# Patient Record
Sex: FEMALE | Race: WHITE | NOT HISPANIC OR LATINO | URBAN - METROPOLITAN AREA
[De-identification: names, ages, dates, MRNs, and addresses within clinical notes are randomized per-mention and may not be internally consistent; named-entity substitution may affect disease eponyms.]

---

## 2020-09-30 ENCOUNTER — OFFICE (OUTPATIENT)
Dept: URBAN - METROPOLITAN AREA CLINIC 19 | Facility: CLINIC | Age: 60
End: 2020-09-30
Payer: COMMERCIAL

## 2020-09-30 VITALS
HEART RATE: 79 BPM | DIASTOLIC BLOOD PRESSURE: 72 MMHG | OXYGEN SATURATION: 97 % | SYSTOLIC BLOOD PRESSURE: 117 MMHG | HEIGHT: 66 IN | WEIGHT: 126 LBS

## 2020-09-30 DIAGNOSIS — R13.10 DYSPHAGIA, UNSPECIFIED: ICD-10-CM

## 2020-09-30 DIAGNOSIS — K76.9 LIVER DISEASE, UNSPECIFIED: ICD-10-CM

## 2020-09-30 DIAGNOSIS — R19.8 OTHER SPECIFIED SYMPTOMS AND SIGNS INVOLVING THE DIGESTIVE S: ICD-10-CM

## 2020-09-30 DIAGNOSIS — Z86.010 PERSONAL HISTORY OF COLONIC POLYPS: ICD-10-CM

## 2020-09-30 DIAGNOSIS — Z83.71 FAMILY HISTORY OF COLONIC POLYPS: ICD-10-CM

## 2020-09-30 DIAGNOSIS — K21.9 GASTRO-ESOPHAGEAL REFLUX DISEASE WITHOUT ESOPHAGITIS: ICD-10-CM

## 2020-09-30 LAB
AFP, SERUM, TUMOR MARKER: 1.3 NG/ML (ref 0–8.3)
CBC, PLATELET, NO DIFFERENTIAL: HEMATOCRIT: 42.6 % (ref 34–46.6)
CBC, PLATELET, NO DIFFERENTIAL: HEMOGLOBIN: 13.3 G/DL (ref 11.1–15.9)
CBC, PLATELET, NO DIFFERENTIAL: MCH: 26.7 PG (ref 26.6–33)
CBC, PLATELET, NO DIFFERENTIAL: MCHC: 31.2 G/DL — LOW (ref 31.5–35.7)
CBC, PLATELET, NO DIFFERENTIAL: MCV: 86 FL (ref 79–97)
CBC, PLATELET, NO DIFFERENTIAL: PLATELETS: 395 X10E3/UL (ref 150–450)
CBC, PLATELET, NO DIFFERENTIAL: RBC: 4.98 X10E6/UL (ref 3.77–5.28)
CBC, PLATELET, NO DIFFERENTIAL: RDW: 12.2 % (ref 11.7–15.4)
CBC, PLATELET, NO DIFFERENTIAL: WBC: 8 X10E3/UL (ref 3.4–10.8)
COMP. METABOLIC PANEL (14): A/G RATIO: 1.7 (ref 1.2–2.2)
COMP. METABOLIC PANEL (14): ALBUMIN: 4.3 G/DL (ref 3.8–4.8)
COMP. METABOLIC PANEL (14): ALKALINE PHOSPHATASE: 42 IU/L (ref 39–117)
COMP. METABOLIC PANEL (14): ALT (SGPT): 18 IU/L (ref 0–32)
COMP. METABOLIC PANEL (14): AST (SGOT): 23 IU/L (ref 0–40)
COMP. METABOLIC PANEL (14): BILIRUBIN, TOTAL: 0.3 MG/DL (ref 0–1.2)
COMP. METABOLIC PANEL (14): BUN/CREATININE RATIO: 16 (ref 12–28)
COMP. METABOLIC PANEL (14): BUN: 18 MG/DL (ref 8–27)
COMP. METABOLIC PANEL (14): CALCIUM: 9.1 MG/DL (ref 8.7–10.3)
COMP. METABOLIC PANEL (14): CARBON DIOXIDE, TOTAL: 27 MMOL/L (ref 20–29)
COMP. METABOLIC PANEL (14): CHLORIDE: 102 MMOL/L (ref 96–106)
COMP. METABOLIC PANEL (14): CREATININE: 1.12 MG/DL — HIGH (ref 0.57–1)
COMP. METABOLIC PANEL (14): EGFR IF AFRICN AM: 61 ML/MIN/1.73 (ref 59–?)
COMP. METABOLIC PANEL (14): EGFR IF NONAFRICN AM: 53 ML/MIN/1.73 — LOW (ref 59–?)
COMP. METABOLIC PANEL (14): GLOBULIN, TOTAL: 2.5 G/DL (ref 1.5–4.5)
COMP. METABOLIC PANEL (14): GLUCOSE: 92 MG/DL (ref 65–99)
COMP. METABOLIC PANEL (14): POTASSIUM: 4.5 MMOL/L (ref 3.5–5.2)
COMP. METABOLIC PANEL (14): PROTEIN, TOTAL: 6.8 G/DL (ref 6–8.5)
COMP. METABOLIC PANEL (14): SODIUM: 143 MMOL/L (ref 134–144)

## 2020-09-30 PROCEDURE — 99214 OFFICE O/P EST MOD 30 MIN: CPT

## 2020-09-30 NOTE — SERVICENOTES
The patient's assessment was reviewed with Dr. Jain and a collaborative plan of care was established.

## 2020-09-30 NOTE — SERVICEHPINOTES
60-year-old  white female returns for evaluation of nonspecific liver lesions found on recent CT heart calcium test 9/29/20 routinely done with her cardiologist, Neno Hammond MD.  She has had some mild loss of appetite in the last few months, but she attributes this to significant stress in her life and denies any weight loss.  She has mild chronic reflux, that she takes pantoprazole 40 mg as needed.  She has some mild cervical dysphagia that she attributes after an anterior cervical disc surgery she had late last year.  She has very occasional bouts of diarrhea when she is under lot of stress, but this occurs once a month and is reportedly of little consequence.  She denies nausea, vomiting or overt GI bleeding. She was last here for colonoscopy 10/22/15  which found diverticulosis coli and removed a small sessile serrated adenoma. Colonoscopy 11/12/08 found diverticulosis coli. Her sister had colon polyps in her 40s and her brother has had colon polyps less than age 60.

## 2020-10-28 ENCOUNTER — AMBULATORY SURGICAL CENTER (OUTPATIENT)
Dept: URBAN - METROPOLITAN AREA SURGERY 2 | Facility: SURGERY | Age: 60
End: 2020-10-28
Payer: COMMERCIAL

## 2020-10-28 ENCOUNTER — AMBULATORY SURGICAL CENTER (OUTPATIENT)
Dept: URBAN - METROPOLITAN AREA SURGERY 2 | Facility: SURGERY | Age: 60
End: 2020-10-28

## 2020-10-28 ENCOUNTER — OFFICE (OUTPATIENT)
Dept: URBAN - METROPOLITAN AREA PATHOLOGY 22 | Facility: PATHOLOGY | Age: 60
End: 2020-10-28
Payer: COMMERCIAL

## 2020-10-28 VITALS
DIASTOLIC BLOOD PRESSURE: 69 MMHG | DIASTOLIC BLOOD PRESSURE: 59 MMHG | RESPIRATION RATE: 16 BRPM | DIASTOLIC BLOOD PRESSURE: 69 MMHG | DIASTOLIC BLOOD PRESSURE: 62 MMHG | HEART RATE: 68 BPM | SYSTOLIC BLOOD PRESSURE: 98 MMHG | RESPIRATION RATE: 17 BRPM | TEMPERATURE: 98.3 F | HEART RATE: 65 BPM | HEART RATE: 57 BPM | SYSTOLIC BLOOD PRESSURE: 93 MMHG | DIASTOLIC BLOOD PRESSURE: 59 MMHG | OXYGEN SATURATION: 100 % | OXYGEN SATURATION: 97 % | HEART RATE: 65 BPM | HEART RATE: 61 BPM | RESPIRATION RATE: 16 BRPM | SYSTOLIC BLOOD PRESSURE: 110 MMHG | DIASTOLIC BLOOD PRESSURE: 57 MMHG | HEART RATE: 60 BPM | DIASTOLIC BLOOD PRESSURE: 65 MMHG | TEMPERATURE: 97 F | DIASTOLIC BLOOD PRESSURE: 57 MMHG | HEART RATE: 60 BPM | RESPIRATION RATE: 17 BRPM | OXYGEN SATURATION: 94 % | WEIGHT: 125 LBS | DIASTOLIC BLOOD PRESSURE: 69 MMHG | HEART RATE: 65 BPM | SYSTOLIC BLOOD PRESSURE: 93 MMHG | SYSTOLIC BLOOD PRESSURE: 110 MMHG | RESPIRATION RATE: 17 BRPM | TEMPERATURE: 98.3 F | HEART RATE: 61 BPM | OXYGEN SATURATION: 94 % | SYSTOLIC BLOOD PRESSURE: 93 MMHG | OXYGEN SATURATION: 100 % | HEART RATE: 60 BPM | WEIGHT: 125 LBS | DIASTOLIC BLOOD PRESSURE: 62 MMHG | DIASTOLIC BLOOD PRESSURE: 59 MMHG | DIASTOLIC BLOOD PRESSURE: 65 MMHG | DIASTOLIC BLOOD PRESSURE: 57 MMHG | OXYGEN SATURATION: 97 % | HEART RATE: 68 BPM | HEIGHT: 66 IN | SYSTOLIC BLOOD PRESSURE: 110 MMHG | OXYGEN SATURATION: 94 % | HEART RATE: 61 BPM | SYSTOLIC BLOOD PRESSURE: 98 MMHG | DIASTOLIC BLOOD PRESSURE: 62 MMHG | SYSTOLIC BLOOD PRESSURE: 98 MMHG | HEIGHT: 66 IN | TEMPERATURE: 97 F | SYSTOLIC BLOOD PRESSURE: 111 MMHG | RESPIRATION RATE: 16 BRPM | TEMPERATURE: 97 F | WEIGHT: 125 LBS | DIASTOLIC BLOOD PRESSURE: 65 MMHG | SYSTOLIC BLOOD PRESSURE: 111 MMHG | OXYGEN SATURATION: 100 % | TEMPERATURE: 98.3 F | HEART RATE: 57 BPM | OXYGEN SATURATION: 97 % | SYSTOLIC BLOOD PRESSURE: 111 MMHG | HEART RATE: 68 BPM | HEART RATE: 57 BPM | HEIGHT: 66 IN

## 2020-10-28 DIAGNOSIS — K21.00 GASTRO-ESOPHAGEAL REFLUX DISEASE WITH ESOPHAGITIS, WITHOUT B: ICD-10-CM

## 2020-10-28 DIAGNOSIS — K29.50 UNSPECIFIED CHRONIC GASTRITIS WITHOUT BLEEDING: ICD-10-CM

## 2020-10-28 DIAGNOSIS — Z83.71 FAMILY HISTORY OF COLONIC POLYPS: ICD-10-CM

## 2020-10-28 DIAGNOSIS — R13.10 DYSPHAGIA, UNSPECIFIED: ICD-10-CM

## 2020-10-28 DIAGNOSIS — K57.30 DIVERTICULOSIS OF LARGE INTESTINE WITHOUT PERFORATION OR ABS: ICD-10-CM

## 2020-10-28 DIAGNOSIS — K22.70 BARRETT'S ESOPHAGUS WITHOUT DYSPLASIA: ICD-10-CM

## 2020-10-28 DIAGNOSIS — Z86.010 PERSONAL HISTORY OF COLONIC POLYPS: ICD-10-CM

## 2020-10-28 DIAGNOSIS — K20.90 ESOPHAGITIS, UNSPECIFIED WITHOUT BLEEDING: ICD-10-CM

## 2020-10-28 PROBLEM — K31.89 OTHER DISEASES OF STOMACH AND DUODENUM: Status: ACTIVE | Noted: 2020-10-28

## 2020-10-28 PROBLEM — K20.9 ESOPHAGITIS, UNSPECIFIED: Status: ACTIVE | Noted: 2020-10-28

## 2020-10-28 PROBLEM — K20.8 OTHER ESOPHAGITIS: Status: ACTIVE | Noted: 2020-10-28

## 2020-10-28 PROCEDURE — 43248 EGD GUIDE WIRE INSERTION: CPT | Mod: 51 | Performed by: INTERNAL MEDICINE

## 2020-10-28 PROCEDURE — 88342 IMHCHEM/IMCYTCHM 1ST ANTB: CPT | Performed by: INTERNAL MEDICINE

## 2020-10-28 PROCEDURE — 43239 EGD BIOPSY SINGLE/MULTIPLE: CPT | Mod: 59 | Performed by: INTERNAL MEDICINE

## 2020-10-28 PROCEDURE — 88305 TISSUE EXAM BY PATHOLOGIST: CPT | Performed by: INTERNAL MEDICINE

## 2020-10-28 PROCEDURE — 45378 DIAGNOSTIC COLONOSCOPY: CPT | Performed by: INTERNAL MEDICINE

## 2020-10-28 PROCEDURE — 88313 SPECIAL STAINS GROUP 2: CPT | Performed by: INTERNAL MEDICINE

## 2021-10-12 ENCOUNTER — OFFICE (OUTPATIENT)
Dept: URBAN - METROPOLITAN AREA CLINIC 19 | Facility: CLINIC | Age: 61
End: 2021-10-12

## 2021-10-12 VITALS
WEIGHT: 125 LBS | HEIGHT: 66 IN | OXYGEN SATURATION: 98 % | SYSTOLIC BLOOD PRESSURE: 126 MMHG | HEART RATE: 67 BPM | DIASTOLIC BLOOD PRESSURE: 78 MMHG

## 2021-10-12 DIAGNOSIS — K22.70 BARRETT'S ESOPHAGUS WITHOUT DYSPLASIA: ICD-10-CM

## 2021-10-12 DIAGNOSIS — R19.4 CHANGE IN BOWEL HABIT: ICD-10-CM

## 2021-10-12 DIAGNOSIS — K86.2 CYST OF PANCREAS: ICD-10-CM

## 2021-10-12 DIAGNOSIS — Z86.010 PERSONAL HISTORY OF COLONIC POLYPS: ICD-10-CM

## 2021-10-12 LAB
AMYLASE: 90 U/L (ref 31–110)
CA 19-9: 9 U/ML (ref 0–35)
CREATININE: 1.04 MG/DL — HIGH (ref 0.57–1)
CREATININE: EGFR IF AFRICN AM: 66 ML/MIN/1.73 (ref 59–?)
CREATININE: EGFR IF NONAFRICN AM: 57 ML/MIN/1.73 — LOW (ref 59–?)
LIPASE: 31 U/L (ref 14–72)

## 2021-10-12 PROCEDURE — 99214 OFFICE O/P EST MOD 30 MIN: CPT

## 2021-10-12 NOTE — SERVICEHPINOTES
61-year-old  white female returns for routine follow-up of pancreatic cyst.
jake Raiv initially saw her after she had had a CT heart calcium test on 9/29/20 by her cardiologist, Neno Hammond MD which found some nonspecific liver lesions.  At the time, she was also having some mild loss of appetite, which she attributed to stress in her life.  She did not have any weight loss.  She was having mild chronic reflux and taking pantoprazole 40 mg as needed.  She was also having some mild cervical dysphagia.  Routine blood work, including AFP, on 9/30/20 was normal.  MRI liver mass protocol on 10/13/20 found hepatic hemangiomas and a tiny (3 mm) cystic lesion suggestive of IPMN.  Radiologist recommended repeat MRI with pancreatic mass protocol in a year.   EGD/colonoscopy 10/28/20 found short-segment nondysplastic Rodas's esophagus, gastritis and diverticulosis coli.  Biopsies were negative for H pylori.
jake Weatherse she was last here, her symptoms of reflux have been well managed on pantoprazole 40 mg daily.  She does have mild IBS with bowel movements of fluctuate between diarrhea and constipation depending on her stress level.  She denies any dysphagia since dilation last year.  She denies nausea, vomiting, abdominal pain or overt GI bleeding.jake Steele was last here for colonoscopy 10/22/15  which found diverticulosis coli and removed a small sessile serrated adenoma. Colonoscopy 11/12/08 found diverticulosis coli. Her sister had colon polyps in her 40s and her brother has had colon polyps less than age 60.

## 2022-09-07 NOTE — SERVICENOTES
71 The patient's assessment was reviewed with Dr. Jain and a collaborative plan of care was established.

## 2022-10-17 ENCOUNTER — OFFICE (OUTPATIENT)
Dept: URBAN - METROPOLITAN AREA CLINIC 19 | Facility: CLINIC | Age: 62
End: 2022-10-17
Payer: COMMERCIAL

## 2022-10-17 VITALS
HEART RATE: 71 BPM | SYSTOLIC BLOOD PRESSURE: 111 MMHG | WEIGHT: 129 LBS | HEIGHT: 66 IN | DIASTOLIC BLOOD PRESSURE: 74 MMHG | OXYGEN SATURATION: 100 %

## 2022-10-17 DIAGNOSIS — K86.2 CYST OF PANCREAS: ICD-10-CM

## 2022-10-17 DIAGNOSIS — Z86.010 PERSONAL HISTORY OF COLONIC POLYPS: ICD-10-CM

## 2022-10-17 DIAGNOSIS — K22.70 BARRETT'S ESOPHAGUS WITHOUT DYSPLASIA: ICD-10-CM

## 2022-10-17 DIAGNOSIS — R14.3 FLATULENCE: ICD-10-CM

## 2022-10-17 DIAGNOSIS — Z83.71 FAMILY HISTORY OF COLONIC POLYPS: ICD-10-CM

## 2022-10-17 DIAGNOSIS — R19.4 CHANGE IN BOWEL HABIT: ICD-10-CM

## 2022-10-17 LAB
AMYLASE: 88 U/L (ref 31–110)
CA 19-9: 10 U/ML (ref 0–35)
CBC, PLATELET, NO DIFFERENTIAL: HEMATOCRIT: 40.3 % (ref 34–46.6)
CBC, PLATELET, NO DIFFERENTIAL: HEMOGLOBIN: 13.2 G/DL (ref 11.1–15.9)
CBC, PLATELET, NO DIFFERENTIAL: MCH: 27.4 PG (ref 26.6–33)
CBC, PLATELET, NO DIFFERENTIAL: MCHC: 32.8 G/DL (ref 31.5–35.7)
CBC, PLATELET, NO DIFFERENTIAL: MCV: 84 FL (ref 79–97)
CBC, PLATELET, NO DIFFERENTIAL: PLATELETS: 338 X10E3/UL (ref 150–450)
CBC, PLATELET, NO DIFFERENTIAL: RBC: 4.81 X10E6/UL (ref 3.77–5.28)
CBC, PLATELET, NO DIFFERENTIAL: RDW: 12.6 % (ref 11.7–15.4)
CBC, PLATELET, NO DIFFERENTIAL: WBC: 7 X10E3/UL (ref 3.4–10.8)
COMP. METABOLIC PANEL (14): A/G RATIO: 2.5 — HIGH (ref 1.2–2.2)
COMP. METABOLIC PANEL (14): ALBUMIN: 4.5 G/DL (ref 3.8–4.8)
COMP. METABOLIC PANEL (14): ALKALINE PHOSPHATASE: 65 IU/L (ref 44–121)
COMP. METABOLIC PANEL (14): ALT (SGPT): 24 IU/L (ref 0–32)
COMP. METABOLIC PANEL (14): AST (SGOT): 29 IU/L (ref 0–40)
COMP. METABOLIC PANEL (14): BILIRUBIN, TOTAL: <0.2 MG/DL
COMP. METABOLIC PANEL (14): BUN/CREATININE RATIO: 18 (ref 12–28)
COMP. METABOLIC PANEL (14): BUN: 19 MG/DL (ref 8–27)
COMP. METABOLIC PANEL (14): CALCIUM: 9.3 MG/DL (ref 8.7–10.3)
COMP. METABOLIC PANEL (14): CARBON DIOXIDE, TOTAL: 25 MMOL/L (ref 20–29)
COMP. METABOLIC PANEL (14): CHLORIDE: 104 MMOL/L (ref 96–106)
COMP. METABOLIC PANEL (14): CREATININE: 1.04 MG/DL — HIGH (ref 0.57–1)
COMP. METABOLIC PANEL (14): EGFR: 60 ML/MIN/1.73 (ref 59–?)
COMP. METABOLIC PANEL (14): GLOBULIN, TOTAL: 1.8 G/DL (ref 1.5–4.5)
COMP. METABOLIC PANEL (14): GLUCOSE: 119 MG/DL — HIGH (ref 70–99)
COMP. METABOLIC PANEL (14): POTASSIUM: 4.9 MMOL/L (ref 3.5–5.2)
COMP. METABOLIC PANEL (14): PROTEIN, TOTAL: 6.3 G/DL (ref 6–8.5)
COMP. METABOLIC PANEL (14): SODIUM: 144 MMOL/L (ref 134–144)
LIPASE: 34 U/L (ref 14–72)

## 2022-10-17 PROCEDURE — 99214 OFFICE O/P EST MOD 30 MIN: CPT

## 2022-10-17 RX ORDER — PANTOPRAZOLE SODIUM 40 MG/1
40 TABLET, DELAYED RELEASE ORAL
Qty: 90 | Refills: 3 | Status: ACTIVE

## 2022-10-17 NOTE — SERVICEHPINOTES
61-year-old  white female returns for routine follow-up of pancreatic cyst and Rodas's esophagus.  The main interval history that she reports is excessive flatulence that she has been experiencing since March of this year.  She denies any change in her diet or medication changes.  No matter what she eats she has significant flatulence that can be embarrassing often.  Her reflux seems to be well managed on pantoprazole 40 mg daily.  She denies nausea, vomiting, dysphagia, abdominal pain or overt GI bleeding.  Her bowel movements do sometimes fluctuate and she will have episodes of diarrhea.  This is been a chronic issue.  She did recently have a rectocele repair and total hysterectomy 6/30/22.I initially saw her after she had had a CT heart calcium test on 9/29/20 by her cardiologist, Neno Hammond MD which found some nonspecific liver lesions.  At the time, she was also having some mild loss of appetite, which she attributed to stress in her life.  She did not have any weight loss.  She was having mild chronic reflux and taking pantoprazole 40 mg as needed.  She was also having some mild cervical dysphagia.  Routine blood work, including AFP, on 9/30/20 was normal.  MRI liver mass protocol on 10/13/20 found hepatic hemangiomas and a tiny (3 mm) cystic lesion suggestive of IPMN.  Radiologist recommended repeat MRI with pancreatic mass protocol in a year.   EGD/colonoscopy 10/28/20 found short-segment nondysplastic Rodas's esophagus, gastritis and diverticulosis coli.  Biopsies were negative for H pylori.She was here for colonoscopy 10/22/15  which found diverticulosis coli and removed a small sessile serrated adenoma. Colonoscopy 11/12/08 found diverticulosis coli. Her sister had colon polyps in her 40s and her brother has had colon polyps less than age 60.

## 2022-10-18 LAB
AMYLASE: 88 U/L (ref 31–110)
CA 19-9: 10 U/ML (ref 0–35)
CBC, PLATELET, NO DIFFERENTIAL: HEMATOCRIT: 40.3 % (ref 34–46.6)
CBC, PLATELET, NO DIFFERENTIAL: HEMOGLOBIN: 13.2 G/DL (ref 11.1–15.9)
CBC, PLATELET, NO DIFFERENTIAL: MCH: 27.4 PG (ref 26.6–33)
CBC, PLATELET, NO DIFFERENTIAL: MCHC: 32.8 G/DL (ref 31.5–35.7)
CBC, PLATELET, NO DIFFERENTIAL: MCV: 84 FL (ref 79–97)
CBC, PLATELET, NO DIFFERENTIAL: NRBC: (no result)
CBC, PLATELET, NO DIFFERENTIAL: PLATELETS: 338 X10E3/UL (ref 150–450)
CBC, PLATELET, NO DIFFERENTIAL: RBC: 4.81 X10E6/UL (ref 3.77–5.28)
CBC, PLATELET, NO DIFFERENTIAL: RDW: 12.6 % (ref 11.7–15.4)
CBC, PLATELET, NO DIFFERENTIAL: WBC: 7 X10E3/UL (ref 3.4–10.8)
COMP. METABOLIC PANEL (14): A/G RATIO: 2.5 — HIGH (ref 1.2–2.2)
COMP. METABOLIC PANEL (14): ALBUMIN: 4.5 G/DL (ref 3.8–4.8)
COMP. METABOLIC PANEL (14): ALKALINE PHOSPHATASE: 65 IU/L (ref 44–121)
COMP. METABOLIC PANEL (14): ALT (SGPT): 24 IU/L (ref 0–32)
COMP. METABOLIC PANEL (14): AST (SGOT): 29 IU/L (ref 0–40)
COMP. METABOLIC PANEL (14): BILIRUBIN, TOTAL: <0.2 MG/DL
COMP. METABOLIC PANEL (14): BUN/CREATININE RATIO: 18 (ref 12–28)
COMP. METABOLIC PANEL (14): BUN: 19 MG/DL (ref 8–27)
COMP. METABOLIC PANEL (14): CALCIUM: 9.3 MG/DL (ref 8.7–10.3)
COMP. METABOLIC PANEL (14): CARBON DIOXIDE, TOTAL: 25 MMOL/L (ref 20–29)
COMP. METABOLIC PANEL (14): CHLORIDE: 104 MMOL/L (ref 96–106)
COMP. METABOLIC PANEL (14): CREATININE: 1.04 MG/DL — HIGH (ref 0.57–1)
COMP. METABOLIC PANEL (14): EGFR: 60 ML/MIN/1.73 (ref 59–?)
COMP. METABOLIC PANEL (14): GLOBULIN, TOTAL: 1.8 G/DL (ref 1.5–4.5)
COMP. METABOLIC PANEL (14): GLUCOSE: 119 MG/DL — HIGH (ref 70–99)
COMP. METABOLIC PANEL (14): POTASSIUM: 4.9 MMOL/L (ref 3.5–5.2)
COMP. METABOLIC PANEL (14): PROTEIN, TOTAL: 6.3 G/DL (ref 6–8.5)
COMP. METABOLIC PANEL (14): SODIUM: 144 MMOL/L (ref 134–144)
LIPASE: 34 U/L (ref 14–72)
PANCREATIC ELASTASE, FECAL: 137 UG ELAST./G — LOW (ref 200–?)

## 2022-10-21 LAB — PANCREATIC ELASTASE, FECAL: 137 UG ELAST./G — LOW (ref 200–?)

## 2022-12-19 ENCOUNTER — OFFICE (OUTPATIENT)
Dept: URBAN - METROPOLITAN AREA CLINIC 19 | Facility: CLINIC | Age: 62
End: 2022-12-19
Payer: COMMERCIAL

## 2022-12-19 VITALS
HEART RATE: 67 BPM | HEIGHT: 66 IN | WEIGHT: 127 LBS | DIASTOLIC BLOOD PRESSURE: 78 MMHG | OXYGEN SATURATION: 98 % | SYSTOLIC BLOOD PRESSURE: 123 MMHG

## 2022-12-19 DIAGNOSIS — Z86.010 PERSONAL HISTORY OF COLONIC POLYPS: ICD-10-CM

## 2022-12-19 DIAGNOSIS — K86.81 EXOCRINE PANCREATIC INSUFFICIENCY: ICD-10-CM

## 2022-12-19 DIAGNOSIS — K86.2 CYST OF PANCREAS: ICD-10-CM

## 2022-12-19 DIAGNOSIS — K58.9 IRRITABLE BOWEL SYNDROME WITHOUT DIARRHEA: ICD-10-CM

## 2022-12-19 DIAGNOSIS — K22.70 BARRETT'S ESOPHAGUS WITHOUT DYSPLASIA: ICD-10-CM

## 2022-12-19 DIAGNOSIS — Z83.71 FAMILY HISTORY OF COLONIC POLYPS: ICD-10-CM

## 2022-12-19 PROCEDURE — 99214 OFFICE O/P EST MOD 30 MIN: CPT

## 2022-12-19 NOTE — SERVICEHPINOTES
62-year-old  white female returns for routine follow-up of her newly diagnosed EPI.
jake joseph

  I saw her last for routine follow-up of her pancreatic cyst and Rodas's esophagus 10/17/22.  At that time, she was complaining of significant flatulence and gas as well as postprandial diarrhea.  Lab work, including CA 19-9 and amylase and lipase, was unremarkable.  Pancreatic elastase 10/18/22 was remarkable for moderate pancreatic insufficiency[ 137 (>200)]. I sent in a prescription for Dandre to take 1 capsule with meals and snacks.  Unfortunately her insurance demanded Pancreaze 37,000 units.  Since starting the enzyme replacement, her gas and bloating has significantly improved, but she has had a little bit of constipation.  Her stools are "small balls. "  If she takes more than 3 enzyme replacement capsules in a day, she will have "explosive diarrhea. "  Her reflux is well managed on Protonix 40 mg/d.  She denies dysphagia, nausea, vomiting, abdominal pain or overt GI bleeding.br
jake  She did recently have a rectocele repair and total hysterectomy 6/30/22.I initially saw her after she had had a CT heart calcium test on 9/29/20 by her cardiologist, Neno Hammond MD which found some nonspecific liver lesions.  At the time, she was also having some mild loss of appetite, which she attributed to stress in her life.  She did not have any weight loss.  She was having mild chronic reflux and taking pantoprazole 40 mg as needed.  She was also having some mild cervical dysphagia.  Routine blood work, including AFP, on 9/30/20 was normal.  MRI liver mass protocol on 10/13/20 found hepatic hemangiomas and a tiny (3 mm) cystic lesion suggestive of IPMN.  Radiologist recommended repeat MRI with pancreatic mass protocol in a year.   EGD/colonoscopy 10/28/20 found short-segment nondysplastic Rodas's esophagus, gastritis and diverticulosis coli.  Biopsies were negative for H pylori.She was here for colonoscopy 10/22/15  which found diverticulosis coli and removed a small sessile serrated adenoma. Colonoscopy 11/12/08 found diverticulosis coli. Her sister had colon polyps in her 40s and her brother has had colon polyps less than age 60.

## 2023-08-31 ENCOUNTER — OFFICE (OUTPATIENT)
Dept: URBAN - METROPOLITAN AREA CLINIC 19 | Facility: CLINIC | Age: 63
End: 2023-08-31

## 2023-08-31 VITALS
HEART RATE: 70 BPM | OXYGEN SATURATION: 98 % | HEIGHT: 66 IN | DIASTOLIC BLOOD PRESSURE: 72 MMHG | SYSTOLIC BLOOD PRESSURE: 118 MMHG | WEIGHT: 125 LBS

## 2023-08-31 DIAGNOSIS — K22.70 BARRETT'S ESOPHAGUS WITHOUT DYSPLASIA: ICD-10-CM

## 2023-08-31 DIAGNOSIS — K86.2 CYST OF PANCREAS: ICD-10-CM

## 2023-08-31 DIAGNOSIS — Z86.010 PERSONAL HISTORY OF COLONIC POLYPS: ICD-10-CM

## 2023-08-31 DIAGNOSIS — K58.9 IRRITABLE BOWEL SYNDROME WITHOUT DIARRHEA: ICD-10-CM

## 2023-08-31 DIAGNOSIS — Z83.71 FAMILY HISTORY OF COLONIC POLYPS: ICD-10-CM

## 2023-08-31 DIAGNOSIS — K86.81 EXOCRINE PANCREATIC INSUFFICIENCY: ICD-10-CM

## 2023-08-31 DIAGNOSIS — K21.9 GASTRO-ESOPHAGEAL REFLUX DISEASE WITHOUT ESOPHAGITIS: ICD-10-CM

## 2023-08-31 PROCEDURE — 99214 OFFICE O/P EST MOD 30 MIN: CPT

## 2023-08-31 RX ORDER — PANCRELIPASE LIPASE, PANCRELIPASE AMYLASE, AND PANCRELIPASE PROTEASE 149900; 37000; 97300 [USP'U]/1; [USP'U]/1; [USP'U]/1
CAPSULE, DELAYED RELEASE ORAL
Qty: 150 | Refills: 11 | Status: ACTIVE
Start: 2022-11-04

## 2023-08-31 NOTE — SERVICEHPINOTES
63-year-old  white female returns for follow-up of EPI. She is done extremely well when she stays on the pancreatic enzymes.  Unfortunately we have had great difficulty in getting appropriate financial coverage for these enzymes.  Her insurance only covers a very small amount.  We have tried to provide samples for her, but has had difficulty often obtaining these.  If she does not take the pancreatic enzymes with the food that she eats, she will have increased bloating, gas and diarrhea.  Her reflux is well managed on Protonix 40 mg daily.  She denies dysphagia, nausea, vomiting, abdominal pain or overt GI bleeding.
br
br Lab work, including CA 19-9 and amylase and lipase, was unremarkable.  Pancreatic elastase 10/18/22 was remarkable for moderate pancreatic insufficiency[ 137 (>200)]. I sent in a prescription for Dandre to take 1 capsule with meals and snacks.  Unfortunately her insurance demanded Pancreaze 37,000 units.  Since starting the enzyme replacement, her gas and bloating has significantly improved, but she has had a little bit of constipation.  Her stools are "small balls. "  If she takes more than 3 enzyme replacement capsules in a day, she will have "explosive diarrhea. "  Her reflux is well managed on Protonix 40 mg/d.  She denies dysphagia, nausea, vomiting, abdominal pain or overt GI bleeding. She did recently have a rectocele repair and total hysterectomy 6/30/22.I initially saw her after she had had a CT heart calcium test on 9/29/20 by her cardiologist, Neno Hammond MD which found some nonspecific liver lesions.  At the time, she was also having some mild loss of appetite, which she attributed to stress in her life.  She did not have any weight loss.  She was having mild chronic reflux and taking pantoprazole 40 mg as needed.  She was also having some mild cervical dysphagia.  Routine blood work, including AFP, on 9/30/20 was normal.  MRI liver mass protocol on 10/13/20 found hepatic hemangiomas and a tiny (3 mm) cystic lesion suggestive of IPMN.  Radiologist recommended repeat MRI with pancreatic mass protocol in a year.   EGD/colonoscopy 10/28/20 found short-segment nondysplastic Rodas's esophagus, gastritis and diverticulosis coli.  Biopsies were negative for H pylori.She was here for colonoscopy 10/22/15  which found diverticulosis coli and removed a small sessile serrated adenoma. Colonoscopy 11/12/08 found diverticulosis coli. Her sister had colon polyps in her 40s and her brother has had colon polyps less than age 60.

## 2024-02-07 ENCOUNTER — OFFICE (OUTPATIENT)
Dept: URBAN - METROPOLITAN AREA PATHOLOGY 12 | Facility: PATHOLOGY | Age: 64
End: 2024-02-07

## 2024-02-07 ENCOUNTER — AMBULATORY SURGICAL CENTER (OUTPATIENT)
Dept: URBAN - METROPOLITAN AREA SURGERY 2 | Facility: SURGERY | Age: 64
End: 2024-02-07

## 2024-02-07 ENCOUNTER — AMBULATORY SURGICAL CENTER (OUTPATIENT)
Dept: URBAN - METROPOLITAN AREA SURGERY 2 | Facility: SURGERY | Age: 64
End: 2024-02-07
Payer: COMMERCIAL

## 2024-02-07 VITALS
SYSTOLIC BLOOD PRESSURE: 118 MMHG | HEART RATE: 57 BPM | RESPIRATION RATE: 18 BRPM | HEIGHT: 66 IN | OXYGEN SATURATION: 98 % | SYSTOLIC BLOOD PRESSURE: 120 MMHG | WEIGHT: 129.6 LBS | OXYGEN SATURATION: 95 % | DIASTOLIC BLOOD PRESSURE: 53 MMHG | DIASTOLIC BLOOD PRESSURE: 53 MMHG | HEART RATE: 56 BPM | OXYGEN SATURATION: 97 % | HEIGHT: 66 IN | DIASTOLIC BLOOD PRESSURE: 72 MMHG | DIASTOLIC BLOOD PRESSURE: 54 MMHG | OXYGEN SATURATION: 97 % | SYSTOLIC BLOOD PRESSURE: 95 MMHG | DIASTOLIC BLOOD PRESSURE: 72 MMHG | HEART RATE: 56 BPM | TEMPERATURE: 98.2 F | DIASTOLIC BLOOD PRESSURE: 52 MMHG | SYSTOLIC BLOOD PRESSURE: 97 MMHG | RESPIRATION RATE: 16 BRPM | HEART RATE: 57 BPM | DIASTOLIC BLOOD PRESSURE: 52 MMHG | SYSTOLIC BLOOD PRESSURE: 118 MMHG | HEART RATE: 62 BPM | HEART RATE: 57 BPM | DIASTOLIC BLOOD PRESSURE: 72 MMHG | WEIGHT: 129.6 LBS | SYSTOLIC BLOOD PRESSURE: 118 MMHG | DIASTOLIC BLOOD PRESSURE: 63 MMHG | SYSTOLIC BLOOD PRESSURE: 95 MMHG | SYSTOLIC BLOOD PRESSURE: 97 MMHG | OXYGEN SATURATION: 96 % | HEART RATE: 62 BPM | RESPIRATION RATE: 16 BRPM | OXYGEN SATURATION: 95 % | SYSTOLIC BLOOD PRESSURE: 95 MMHG | DIASTOLIC BLOOD PRESSURE: 54 MMHG | OXYGEN SATURATION: 96 % | RESPIRATION RATE: 18 BRPM | HEART RATE: 56 BPM | OXYGEN SATURATION: 98 % | TEMPERATURE: 98.2 F | TEMPERATURE: 98.2 F | HEART RATE: 62 BPM | OXYGEN SATURATION: 95 % | DIASTOLIC BLOOD PRESSURE: 53 MMHG | DIASTOLIC BLOOD PRESSURE: 63 MMHG | DIASTOLIC BLOOD PRESSURE: 54 MMHG | DIASTOLIC BLOOD PRESSURE: 63 MMHG | OXYGEN SATURATION: 96 % | RESPIRATION RATE: 16 BRPM | SYSTOLIC BLOOD PRESSURE: 120 MMHG | OXYGEN SATURATION: 98 % | WEIGHT: 129.6 LBS | SYSTOLIC BLOOD PRESSURE: 97 MMHG | HEIGHT: 66 IN | SYSTOLIC BLOOD PRESSURE: 120 MMHG | DIASTOLIC BLOOD PRESSURE: 52 MMHG | OXYGEN SATURATION: 97 % | RESPIRATION RATE: 18 BRPM

## 2024-02-07 DIAGNOSIS — K21.9 GASTRO-ESOPHAGEAL REFLUX DISEASE WITHOUT ESOPHAGITIS: ICD-10-CM

## 2024-02-07 DIAGNOSIS — K21.00 GASTRO-ESOPHAGEAL REFLUX DISEASE WITH ESOPHAGITIS, WITHOUT B: ICD-10-CM

## 2024-02-07 DIAGNOSIS — K22.70 BARRETT'S ESOPHAGUS WITHOUT DYSPLASIA: ICD-10-CM

## 2024-02-07 PROBLEM — K20.80 OTHER ESOPHAGITIS WITHOUT BLEEDING: Status: ACTIVE | Noted: 2024-02-07

## 2024-02-07 PROBLEM — K20.90 ESOPHAGITIS, UNSPECIFIED WITHOUT BLEEDING: Status: ACTIVE | Noted: 2024-02-07

## 2024-02-07 PROCEDURE — 88313 SPECIAL STAINS GROUP 2: CPT | Performed by: STUDENT IN AN ORGANIZED HEALTH CARE EDUCATION/TRAINING PROGRAM

## 2024-02-07 PROCEDURE — 88305 TISSUE EXAM BY PATHOLOGIST: CPT | Performed by: STUDENT IN AN ORGANIZED HEALTH CARE EDUCATION/TRAINING PROGRAM

## 2024-02-07 PROCEDURE — 43239 EGD BIOPSY SINGLE/MULTIPLE: CPT | Performed by: INTERNAL MEDICINE

## 2024-02-09 LAB
GASTRO ONE PATHOLOGY: PDF REPORT: (no result)